# Patient Record
Sex: MALE | Race: WHITE | NOT HISPANIC OR LATINO | Employment: FULL TIME | URBAN - METROPOLITAN AREA
[De-identification: names, ages, dates, MRNs, and addresses within clinical notes are randomized per-mention and may not be internally consistent; named-entity substitution may affect disease eponyms.]

---

## 2019-11-12 ENCOUNTER — HOSPITAL ENCOUNTER (OUTPATIENT)
Dept: OTHER | Facility: HOSPITAL | Age: 24
Discharge: HOME | End: 2019-11-12
Attending: NURSE PRACTITIONER

## 2019-11-12 LAB
APPEARANCE UR: CLEAR
BILIRUB UR QL: NEGATIVE
COLOR UR: YELLOW
CONV COLLECTION SOURCE (UA): NORMAL
CONV UROBILINOGEN IN URINE BY AUTOMATED TEST STRIP: 1 {EHRLICHU}/DL (ref 0.1–1)
GLUCOSE UR QL: NEGATIVE MG/DL
HGB UR QL STRIP: NEGATIVE
KETONES UR QL STRIP: NEGATIVE MG/DL
LEUKOCYTE ESTERASE UR QL STRIP: NEGATIVE
NITRITE UR QL STRIP: NEGATIVE
PH UR STRIP.AUTO: 7.5 [PH] (ref 5–8)
PROT UR QL: NEGATIVE MG/DL
SP GR UR: 1.02 (ref 1–1.03)

## 2019-11-14 LAB — BACTERIA UR CULT: NORMAL

## 2020-02-22 ENCOUNTER — HOSPITAL ENCOUNTER (OUTPATIENT)
Dept: URGENT CARE | Facility: CLINIC | Age: 25
Discharge: HOME | End: 2020-02-22
Attending: NURSE PRACTITIONER

## 2020-02-24 LAB — BACTERIA SPEC AEROBE CULT: NORMAL

## 2020-05-18 ENCOUNTER — TELEMEDICINE CONVERTED (OUTPATIENT)
Dept: GASTROENTEROLOGY | Facility: CLINIC | Age: 25
End: 2020-05-18
Attending: NURSE PRACTITIONER

## 2020-05-26 ENCOUNTER — HOSPITAL ENCOUNTER (OUTPATIENT)
Dept: OTHER | Facility: HOSPITAL | Age: 25
Discharge: HOME | End: 2020-05-26
Attending: NURSE PRACTITIONER

## 2020-05-26 LAB
APPEARANCE UR: CLEAR
BILIRUB UR QL: NEGATIVE
COLOR UR: YELLOW
CONV COLLECTION SOURCE (UA): NORMAL
CONV UROBILINOGEN IN URINE BY AUTOMATED TEST STRIP: 1 {EHRLICHU}/DL (ref 0.1–1)
GLUCOSE UR QL: NEGATIVE MG/DL
HGB UR QL STRIP: NEGATIVE
KETONES UR QL STRIP: NEGATIVE MG/DL
LEUKOCYTE ESTERASE UR QL STRIP: NEGATIVE
NITRITE UR QL STRIP: NEGATIVE
PH UR STRIP.AUTO: 8 [PH] (ref 5–8)
PROT UR QL: NEGATIVE MG/DL
SP GR UR: 1.01 (ref 1–1.03)

## 2020-05-28 LAB — BACTERIA UR CULT: NORMAL

## 2020-06-02 LAB — SARS-COV-2 RNA SPEC QL NAA+PROBE: NOT DETECTED

## 2020-06-04 ENCOUNTER — HOSPITAL ENCOUNTER (OUTPATIENT)
Dept: GASTROENTEROLOGY | Facility: HOSPITAL | Age: 25
Setting detail: HOSPITAL OUTPATIENT SURGERY
Discharge: HOME | End: 2020-06-04
Attending: INTERNAL MEDICINE

## 2020-06-05 ENCOUNTER — HOSPITAL ENCOUNTER (OUTPATIENT)
Dept: GASTROENTEROLOGY | Facility: HOSPITAL | Age: 25
Setting detail: HOSPITAL OUTPATIENT SURGERY
Discharge: HOME | End: 2020-06-05
Attending: INTERNAL MEDICINE

## 2020-08-11 ENCOUNTER — HOSPITAL ENCOUNTER (OUTPATIENT)
Dept: GENERAL RADIOLOGY | Facility: HOSPITAL | Age: 25
Discharge: HOME | End: 2020-08-11
Attending: NURSE PRACTITIONER

## 2020-08-13 ENCOUNTER — TELEPHONE CONVERTED (OUTPATIENT)
Dept: GASTROENTEROLOGY | Facility: CLINIC | Age: 25
End: 2020-08-13
Attending: NURSE PRACTITIONER

## 2020-08-27 ENCOUNTER — HOSPITAL ENCOUNTER (OUTPATIENT)
Dept: NUCLEAR MEDICINE | Facility: HOSPITAL | Age: 25
Discharge: HOME | End: 2020-08-27
Attending: NURSE PRACTITIONER

## 2020-08-27 LAB
ALBUMIN SERPL-MCNC: 4.8 G/DL (ref 3.5–5)
ALBUMIN/GLOB SERPL: 1.7 {RATIO} (ref 1.4–2.6)
ALP SERPL-CCNC: 63 U/L (ref 53–128)
ALT SERPL-CCNC: 45 U/L (ref 10–40)
ANION GAP SERPL CALC-SCNC: 21 MMOL/L (ref 8–19)
AST SERPL-CCNC: 24 U/L (ref 15–50)
BASOPHILS # BLD AUTO: 0.04 10*3/UL (ref 0–0.2)
BASOPHILS NFR BLD AUTO: 0.5 % (ref 0–3)
BILIRUB SERPL-MCNC: 0.35 MG/DL (ref 0.2–1.3)
BUN SERPL-MCNC: 16 MG/DL (ref 5–25)
BUN/CREAT SERPL: 15 {RATIO} (ref 6–20)
CALCIUM SERPL-MCNC: 10.3 MG/DL (ref 8.7–10.4)
CHLORIDE SERPL-SCNC: 104 MMOL/L (ref 99–111)
CONV ABS IMM GRAN: 0.03 10*3/UL (ref 0–0.2)
CONV AFP: 1.9 NG/ML (ref 0–8.7)
CONV CO2: 23 MMOL/L (ref 22–32)
CONV IMMATURE GRAN: 0.4 % (ref 0–1.8)
CONV TOTAL PROTEIN: 7.6 G/DL (ref 6.3–8.2)
CREAT UR-MCNC: 1.08 MG/DL (ref 0.7–1.2)
DEPRECATED RDW RBC AUTO: 42.3 FL (ref 35.1–43.9)
EOSINOPHIL # BLD AUTO: 0.13 10*3/UL (ref 0–0.7)
EOSINOPHIL # BLD AUTO: 1.5 % (ref 0–7)
ERYTHROCYTE [DISTWIDTH] IN BLOOD BY AUTOMATED COUNT: 12.8 % (ref 11.6–14.4)
FERRITIN SERPL-MCNC: 186 NG/ML (ref 30–300)
GFR SERPLBLD BASED ON 1.73 SQ M-ARVRAT: >60 ML/MIN/{1.73_M2}
GLOBULIN UR ELPH-MCNC: 2.8 G/DL (ref 2–3.5)
GLUCOSE SERPL-MCNC: 90 MG/DL (ref 70–99)
HCT VFR BLD AUTO: 50 % (ref 42–52)
HGB BLD-MCNC: 16.1 G/DL (ref 14–18)
INR PPP: 0.94 (ref 2–3)
IRON SATN MFR SERPL: 30 % (ref 20–55)
IRON SERPL-MCNC: 123 UG/DL (ref 70–180)
LYMPHOCYTES # BLD AUTO: 3.23 10*3/UL (ref 1–5)
LYMPHOCYTES NFR BLD AUTO: 38.4 % (ref 20–45)
MCH RBC QN AUTO: 29.1 PG (ref 27–31)
MCHC RBC AUTO-ENTMCNC: 32.2 G/DL (ref 33–37)
MCV RBC AUTO: 90.4 FL (ref 80–96)
MONOCYTES # BLD AUTO: 0.43 10*3/UL (ref 0.2–1.2)
MONOCYTES NFR BLD AUTO: 5.1 % (ref 3–10)
NEUTROPHILS # BLD AUTO: 4.55 10*3/UL (ref 2–8)
NEUTROPHILS NFR BLD AUTO: 54.1 % (ref 30–85)
NRBC CBCN: 0 % (ref 0–0.7)
OSMOLALITY SERPL CALC.SUM OF ELEC: 299 MOSM/KG (ref 273–304)
PLATELET # BLD AUTO: 237 10*3/UL (ref 130–400)
PMV BLD AUTO: 10 FL (ref 9.4–12.4)
POTASSIUM SERPL-SCNC: 3.7 MMOL/L (ref 3.5–5.3)
PROTHROMBIN TIME: 10.3 S (ref 9.4–12)
RBC # BLD AUTO: 5.53 10*6/UL (ref 4.7–6.1)
SODIUM SERPL-SCNC: 144 MMOL/L (ref 135–147)
TIBC SERPL-MCNC: 413 UG/DL (ref 245–450)
TRANSFERRIN SERPL-MCNC: 289 MG/DL (ref 215–365)
WBC # BLD AUTO: 8.41 10*3/UL (ref 4.8–10.8)

## 2020-08-28 LAB
ALBUMIN SERPL-MCNC: 4 G/DL (ref 2.9–4.4)
ALBUMIN/GLOB SERPL: 1.3 {RATIO} (ref 0.7–1.7)
ALPHA2 GLOB SERPL ELPH-MCNC: 0.8 G/DL (ref 0.4–1)
BETA GLOBULIN: 1.1 G/DL (ref 0.7–1.3)
CERULOPLASMIN SERPL-MCNC: 20.2 MG/DL (ref 16–31)
CONV ALPHA-1-GLOBULIN: 0.2 G/DL (ref 0–0.4)
CONV HEPATITIS B SURFACE AG W CONFIRMATION RE: NEGATIVE
CONV IMMUNOGLOBULIN G (IGG): 915 MG/DL (ref 603–1613)
CONV IMMUNOGLOBULIN M (IGM): 22 MG/DL (ref 20–172)
CONV PE INTERPRETATION: NORMAL
CONV PE NOTE: NORMAL
CONV TOTAL PROTEIN: 7 G/DL (ref 6–8.5)
DEPRECATED MITOCHONDRIA M2 IGG SER-ACNC: <20 UNITS (ref 0–20)
DSDNA AB SER-ACNC: NEGATIVE [IU]/ML
ENA AB SER IA-ACNC: NEGATIVE {RATIO}
GAMMA GLOB SERPL ELPH-MCNC: 0.9 G/DL (ref 0.4–1.8)
GLOBULIN UR ELPH-MCNC: 3 G/DL (ref 2.2–3.9)
HAV IGM SERPL QL IA: NEGATIVE
HBV CORE IGM SERPL QL IA: NEGATIVE
HCV AB SER DONR QL: <0.1 S/CO RATIO (ref 0–0.9)
IGA SERPL-MCNC: 178 MG/DL (ref 90–386)
M-SPIKE: NORMAL G/DL
PROT PATTERN SERPL IFE-IMP: NORMAL
SMOOTH MUSCLE F-ACTIN AB IGG: 19 UNITS (ref 0–19)

## 2020-08-29 LAB — COPPER SERPL-MCNC: 96 UG/DL (ref 72–166)

## 2020-08-31 LAB
A1AT SERPL-MCNC: 125 MG/DL (ref 95–164)
PHENOTYPE: NORMAL

## 2020-09-01 LAB — CONV NASH FIBROSURE: NORMAL

## 2020-10-05 ENCOUNTER — TELEPHONE CONVERTED (OUTPATIENT)
Dept: GASTROENTEROLOGY | Facility: CLINIC | Age: 25
End: 2020-10-05
Attending: NURSE PRACTITIONER

## 2021-05-10 NOTE — H&P
History and Physical      Patient Name: Abhay Keene   Patient ID: [id number]   Sex: Male   YOB: 1995        Visit Date: May 18, 2020    Provider: HAILE Ivey   Location: [facility] [place]   Location Address: [address] [place] [address]  [id number]   Location Phone: [phone]          Chief Complaint  · lower Right side pain  · vomiting   · diarrhea      History Of Present Illness  Abhay Keene is a 24 year old /White male who presents to the office today.   Video Conferencing Visit  Abhay Keene is a 24 year old /White male who is presenting for evaluation via video conferencing. Verbal consent obtained before beginning visit.   The following staff were present during this visit: Marilu Montana MA; Monet Lindsey MA          Pt states he's had RUQ pain for years, states he's had testing that's negative. States pain can be sharp, has to lean to left to get relief, can be triggered by meal or can happen w/o eating as well, feels sx are worsening.  If eats very much has abd bloating, early satiety,  and if eats more than once a day has vomiting for the last 2-3 yrs. Has had some diarrhea, hx of chronic constipation. No blood in stool or black stool. Denies NSAID use. No unint wt loss. No HB or indigestion.    2018 negative DISIDA scan.  Labs from Nov unremarkable       Past Surgical History  Arm Surgery         Medication List  Name Date Started Instructions   albuterol sulfate 90 mcg/actuation inhalation HFA aerosol inhaler  inhale 1 puff (90 mcg) by inhalation route every 6 hours as needed   metoprolol succinate 25 mg oral tablet extended release 24 hr  take 1 tablet (25 mg) by oral route once daily   quetiapine 50 mg oral tablet  take 1 tablet (50 mg) by oral route 2 times per day         Allergy List  SULFA (SULFONAMIDES)       Allergies Reconciled  Family Medical History  Colon Cancer         Social History  Tobacco         Review of  Systems  · Constitutional  o Admits  o : good general health lately, no acute distress  · HENT  o Denies  o : hearing problems, trouble swallowing  · Cardiovascular  o Denies  o : chest pain, blood clots or phlebitis  · Respiratory  o Admits  o : asthma  o Denies  o : shortness of breath  · Gastrointestinal  o Denies  o : additional gastrointestinal symptoms except as noted in the HPI  · Genitourinary  o Admits  o : kidney stone  o Denies  o : dysuria  · Neurologic  o Denies  o : strokes, seizure activity  · Musculoskeletal  o Admits  o : arthritis, bone or joint pain  · Psychiatric  o Admits  o : anxiety, pleasant affect  · Heme-Lymph  o Denies  o : bleeding disorder  · Allergic-Immunologic  o Denies  o : seasonal allergies      Physical Examination  · Constitutional  o Appearance  o : well developed, well-nourished, in no acute distress  · Head and Face  o Head  o :   § Inspection  § : atraumatic, normocephalic  · Eyes  o Sclerae  o : sclerae white, no sclerae icterus  · Neck  o Inspection/Palpation  o : supple  · Respiratory  o Respiratory Effort  o : breathing unlabored  · Skin and Subcutaneous Tissue  o General Inspection  o : no lesions present, no rashes present  · Neurologic  o Mental Status Examination  o :   § Orientation  § : grossly oriented to person, place and time  § Speech/Language  § : communication ability within normal limits, voice quality normal, articulation of speech normal, no evidence of aphasia  § Attention  § : attention normal, concentration abilities normal  · Psychiatric  o General  o : Alert and oriented x3  o Mood and Affect  o : Mood and affect are appropriate to circumstances          Assessment  · Pre-op exam     V72.84/Z01.818  · Abdominal bloating     787.3/R14.0  · Right upper quadrant abdominal pain     789.01/R10.11  · Vomiting alone     787.03/R11.11  · Early satiety     780.94/R68.81      Plan  · Orders  o RUQ US (right upper quadrant ultrasound) (91218) - -  05/18/2020  o [facility] Pre-Op Covid-19 Screening (84003) - - 05/18/2020  · Medications  o Medications have been Reconciled  o Transition of Care or Provider Policy  · Instructions  o Please Sign Permit for: EGD  o Indication: RUQ pain , vomiting , abd bloating/early satiety  o Surgical Risk and Benefits: Possible risks/complications, benefits, and alternatives to surgical or invasive procedure have been explained to patient and/or legal guardian; Patient has been evaluated and can tolerate anesthesia and/or sedation. Risks, benefits, and alternatives to anesthesia and sedation have been explained to patient and/or legal guardian.  o Follow Up after Procedure.  o Encouraged to follow-up with Primary Care Provider for preventative care.  o Patient was educated/instructed on their diagnosis, treatment and medications prior to discharge from the clinic today.  o Patient instructed to seek medical attention urgently for new or worsening symptoms.            Electronically Signed by: HAILE Ivey -Author on August 13, 2020 04:18:44 PM

## 2021-05-13 NOTE — PROGRESS NOTES
Progress Note      Patient Name: Abhay Keene   Patient ID: [id number]   Sex: Male   YOB: 1995    Primary Care Provider: Emma BE   Referring Provider: Eun BE    Visit Date: August 13, 2020    Provider: HAILE Ivey   Location: [facility] [place]   Location Address: [address] [place] [address]  [id number]   Location Phone: [phone]          Chief Complaint  · follow up EGD   · liver enzymes are high       History Of Present Illness  TELEHEALTH TELEPHONE VISIT  Chief Complaint: PT state it's a follow up on EGD. He did an ultrasound the other day. liver enzymes are high, he went to his PCP and he got blood. No other issue at this time.   Abahy Keene is a 24 year old /White male who is presenting for evaluation via telehealth telephone visit. Verbal consent obtained before beginning visit.   Provider spent 14 minutes with the patient during the telehealth visit.   The following staff were present during this visit: Pablo Raymond MA; Uma Suárez MOHELENE   Past Medical History/ Overview of Patient Symptoms     Patient initially seen May 2020 with complaint of vomiting or diarrhea if he eats more than 1 meal per day.  Random right-sided pain.    EGD 6/4/2020 normal esophagus, normal stomachbiopsy negative, and duodenumbiopsy negative (CT per DR Albert showed possible Crohn's --requesting CT)  Colonoscopy 6/5/2020: Adequate prep, normal mucosa throughoutTI biopsy negative, biopsies throughout the colon and rectum were all negative  RUQ US 8/11/2020: No acute findings were seen the liver is more echogenic than the right renal cortex which may suggest borderline hepatic steatosis    Pt states he had recent labs and had some elev LFTs. Pt states he has had some right side discomfort/numbness. Denies ETOH. Pt states he still only eats one meal / day. States he feels bad if he eats more than that--can have more stools and feels  nauseated which makes him feel miserable. He has been doing this x 6 yrs.  Pt states he is feeling better w less vomiting/diarrhea.   States sharp pains are less often/not as bad.       Past Medical History  Asthma; Irregular heart beat         Past Surgical History  Arm Surgery         Medication List  Name Date Started Instructions   albuterol sulfate 90 mcg/actuation inhalation HFA aerosol inhaler  inhale 1 puff (90 mcg) by inhalation route every 6 hours as needed   metoprolol succinate 25 mg oral tablet extended release 24 hr  take 1 tablet (25 mg) by oral route once daily   quetiapine 50 mg oral tablet  take 1 tablet (50 mg) by oral route 2 times per day         Allergy List  SULFA (SULFONAMIDES)         Family Medical History  Colon Cancer         Social History  Tobacco             Assessment  · Elevated LFTs     790.6/R79.89  · Fatty liver     571.8/K76.0  · Nausea     787.02/R11.0  · Abdominal pain     789.00/R10.9  epigastric / RUQ pain      Plan  · Orders  o CBC with Auto Diff [facility] (79598) - - 08/13/2020  o CMP [facility] (55212) - - 08/13/2020  o Acute hepatitis panel (HAV IgM, HbcAb IgM, HbsAg, HCV) (08091, 00540, 93718, 57021, 67880) - - 08/13/2020  o Copper level (92961) - - 08/13/2020  o Ceruloplasmin level (14281) - - 08/13/2020  o Anti-Smooth Muscle Antibody (ASMA) [facility] (85320) - - 08/13/2020  o Anti-mitochondrial antibody assay (96656) - - 08/13/2020  o Alpha-1-Antitrypsin/Phenotype [facility] (78267, 62693) - - 08/13/2020  o Iron Profile (Iron 83776 TIBC 78928 and Transferrin 38487) (IRONP) - - 08/13/2020  o PT/INR (48364) - - 08/13/2020  o AFP ser (27255) - - 08/13/2020  o ISIDRO (antinuclear antibody profile) by enzyme immunoassay (94247) - - 08/13/2020  o Ferritin ser/plas (68632) - - 08/13/2020  o GUERRERO Fibrosure [facility] (drawn at [facility] only and must be fasting 8 hours; requires HT and WT) (74750, 98668, 94744, 53802, 84989, 31147, 30723, 47436, 01533, 08356) - - 08/13/2020  o Immunoelectrophoresis, Serum [facility] (66825,  11563, 83825, 98057) - - 08/13/2020  o Gastric Emptying Study [facility] (97890) - - 08/13/2020  · Instructions  o Patient instructed to seek medical attention urgently for new or worsening symptoms.  o Call the office with any concerns or questions.  o F/U after testing  o Low carb diet r/w pt             Electronically Signed by: HAILE Ivey -Author on August 13, 2020 04:36:02 PM

## 2021-05-13 NOTE — PROGRESS NOTES
Progress Note      Patient Name: Abhay Keene   Patient ID: [id number]   Sex: Male   YOB: 1995    Primary Care Provider: Emma BE   Referring Provider: Eun BE    Visit Date: October 5, 2020    Provider: HAILE Ivey   Location: [facility] [address]   Location Address: [address] [place] [address]  [id number]   Location Phone: [phone]          Chief Complaint  · follow up GES       History Of Present Illness  TELEHEALTH TELEPHONE VISIT  Abhay Keene is a 24 year old /White male who is presenting for evaluation via telehealth telephone visit. Verbal consent obtained before beginning visit.   Provider spent 22 minutes with the patient during the telehealth visit.   The following staff were present during this visit: Pablo Raymond MA; Uma GUILLORY   Past Medical History/ Overview of Patient Symptoms     last CT found [place] was 2016)  Colonoscopy 6/5/2020: Adequate prep, normal mucosa throughout TI biopsy negative, biopsies throughout the colon and rectum were all negative  RUQ US 8/11/2020: No acute findings were seen the liver is more echogenic than the right renal cortex which may suggest borderline hepatic steatosis    Pt last seen August reporting recent elev LFTs and right side discomfort, no ETOH. Reported sharp pains and vomiting were less often, but still trouble eating more than once/day d/t pain and loose stools.     8/27/2020: CBC unremarkable, INR 0.94, CMP unremarkable except ALT 45 (AST 24, alk phos 63, bilirubin 0.35, albumin 4.8)  8/27/2020: Complete Hepatic work-up negative and fibro-sure shows F0, S2, N1  8/27/2020: GES negative    Today, pt still states if he eats more than 1 meal / day he becomes nauseated, feels full frequently. Eating worsens sx. Denies HB but c/o bloating all the time. Tried Prilosec and it did help abd pain, but states it didn't help him be able to eat any more. Soft  "stools, sometimes watery stools, can be postprandial. He does feel better w more frequent stools, states abd pain is worse if no BM.  Averages 4-7 stools/day. No nocturnal stools.    Reported  ht/wt, calculated BMI at 32--pt reports gaining weight over the last year when he quit smoking. Denies marijuana use for several yrs.\">Patient initially seen May 2020 with complaint of vomiting or diarrhea if he eats more than 1 meal per day.  Random right-sided pain.    EGD 6/4/2020 normal esophagus, normal stomach--biopsy negative, and normal duodenum--biopsy negative (CT per DR Albert showed possible Crohn's --requesting CT--> last CT found [facility] was 2016)  Colonoscopy 6/5/2020: Adequate prep, normal mucosa throughout TI biopsy negative, biopsies throughout the colon and rectum were all negative  RUQ US 8/11/2020: No acute findings were seen the liver is more echogenic than the right renal cortex which may suggest borderline hepatic steatosis    Pt last seen August reporting recent elev LFTs and right side discomfort, no ETOH. Reported sharp pains and vomiting were less often, but still trouble eating more than once/day d/t pain and loose stools.     8/27/2020: CBC unremarkable, INR 0.94, CMP unremarkable except ALT 45 (AST 24, alk phos 63, bilirubin 0.35, albumin 4.8)  8/27/2020: Complete Hepatic work-up negative and fibro-sure shows F0, S2, N1  8/27/2020: GES negative    Today, pt still states if he eats more than 1 meal / day he becomes nauseated, feels full frequently. Eating worsens sx. Denies HB but c/o bloating all the time. Tried Prilosec and it did help abd pain, but states it didn't help him be able to eat any more. Soft stools, sometimes watery stools, can be postprandial. He does feel better w more frequent stools, states abd pain is worse if no BM.  Averages 4-7 stools/day. No nocturnal stools.    Reported  ht/wt, calculated BMI at 32--pt reports gaining weight over the last year when he quit smoking. Denies " marijuana use for several yrs.       Past Medical History  Asthma; Irregular heart beat         Past Surgical History  Arm Surgery         Medication List  Name Date Started Instructions   albuterol sulfate 90 mcg/actuation inhalation HFA aerosol inhaler  inhale 1 puff (90 mcg) by inhalation route every 6 hours as needed   metoprolol succinate 25 mg oral tablet extended release 24 hr  take 1 tablet (25 mg) by oral route once daily   quetiapine 50 mg oral tablet  take 1 tablet (50 mg) by oral route 2 times per day         Allergy List  SULFA (SULFONAMIDES)         Family Medical History  Disease Name Relative/Age Notes   Colon Cancer Grandmother (maternal)/60   --          Social History  Tobacco             Assessment  · Abdominal pain     789.00/R10.9  · Early satiety     780.94/R68.81  · Loose stools     787.7/R19.5  · Fatty liver     571.8/K76.0  · Heartburn     787.1/R12      Plan  · Orders  o CT Abdomen/Pelvis- Enterography protocol with IV Contrast [facility] (Oral Prep given in CT Dept) (42241) - 789.00/R10.9, 787.7/R19.5 - 10/05/2020  · Medications  o omeprazole 20 mg oral capsule,delayed release(DR/EC)   SIG: take 1 capsule by oral route daily for 30 days   DISP: (30) Capsule with 3 refills  Prescribed on 10/05/2020     · Instructions  o Plan Of Care:   o Call the office with any concerns or questions.  o D/W pt importance of wt loss w fatty liver  o F/U after  o If CT neg, may try xifaxin. Also suggested pt try IB lesly.             Electronically Signed by: HAILE Ivey -Author on October 5, 2020 05:16:08 PM

## 2021-11-03 PROCEDURE — U0004 COV-19 TEST NON-CDC HGH THRU: HCPCS | Performed by: FAMILY MEDICINE

## 2021-11-04 ENCOUNTER — TELEPHONE (OUTPATIENT)
Dept: URGENT CARE | Facility: CLINIC | Age: 26
End: 2021-11-04

## 2021-12-16 ENCOUNTER — LAB REQUISITION (OUTPATIENT)
Dept: LAB | Facility: HOSPITAL | Age: 26
End: 2021-12-16

## 2021-12-16 DIAGNOSIS — E55.9 VITAMIN D DEFICIENCY, UNSPECIFIED: ICD-10-CM

## 2021-12-16 DIAGNOSIS — R00.2 PALPITATIONS: ICD-10-CM

## 2021-12-16 DIAGNOSIS — I10 ESSENTIAL (PRIMARY) HYPERTENSION: ICD-10-CM

## 2021-12-16 DIAGNOSIS — R19.7 DIARRHEA, UNSPECIFIED: ICD-10-CM

## 2021-12-16 DIAGNOSIS — J45.909 UNSPECIFIED ASTHMA, UNCOMPLICATED: ICD-10-CM

## 2021-12-16 DIAGNOSIS — B00.89 OTHER HERPESVIRAL INFECTION: ICD-10-CM

## 2021-12-16 DIAGNOSIS — F41.9 ANXIETY DISORDER, UNSPECIFIED: ICD-10-CM

## 2021-12-16 DIAGNOSIS — R10.84 GENERALIZED ABDOMINAL PAIN: ICD-10-CM

## 2021-12-16 DIAGNOSIS — R53.83 OTHER FATIGUE: ICD-10-CM

## 2021-12-16 DIAGNOSIS — D51.9 VITAMIN B12 DEFICIENCY ANEMIA, UNSPECIFIED: ICD-10-CM

## 2021-12-16 DIAGNOSIS — R73.01 IMPAIRED FASTING GLUCOSE: ICD-10-CM

## 2021-12-16 DIAGNOSIS — K58.2 MIXED IRRITABLE BOWEL SYNDROME: ICD-10-CM

## 2021-12-16 DIAGNOSIS — G43.009 MIGRAINE WITHOUT AURA, NOT INTRACTABLE, WITHOUT STATUS MIGRAINOSUS: ICD-10-CM

## 2021-12-16 DIAGNOSIS — F31.31 BIPOLAR DISORDER, CURRENT EPISODE DEPRESSED, MILD (HCC): ICD-10-CM

## 2021-12-16 DIAGNOSIS — K59.09 OTHER CONSTIPATION: ICD-10-CM

## 2021-12-16 DIAGNOSIS — J30.89 OTHER ALLERGIC RHINITIS: ICD-10-CM

## 2021-12-16 DIAGNOSIS — E53.9 VITAMIN B DEFICIENCY, UNSPECIFIED: ICD-10-CM

## 2021-12-16 DIAGNOSIS — D50.9 IRON DEFICIENCY ANEMIA, UNSPECIFIED: ICD-10-CM

## 2021-12-16 LAB
ALBUMIN SERPL-MCNC: 4.7 G/DL (ref 3.5–5.2)
ALBUMIN/GLOB SERPL: 1.9 G/DL
ALP SERPL-CCNC: 69 U/L (ref 39–117)
ALT SERPL W P-5'-P-CCNC: 104 U/L (ref 1–41)
ANION GAP SERPL CALCULATED.3IONS-SCNC: 13.3 MMOL/L (ref 5–15)
AST SERPL-CCNC: 53 U/L (ref 1–40)
BILIRUB SERPL-MCNC: 0.3 MG/DL (ref 0–1.2)
BUN SERPL-MCNC: 14 MG/DL (ref 6–20)
BUN/CREAT SERPL: 15.2 (ref 7–25)
CALCIUM SPEC-SCNC: 9.9 MG/DL (ref 8.6–10.5)
CHLORIDE SERPL-SCNC: 103 MMOL/L (ref 98–107)
CHOLEST SERPL-MCNC: 176 MG/DL (ref 0–200)
CO2 SERPL-SCNC: 22.7 MMOL/L (ref 22–29)
CREAT SERPL-MCNC: 0.92 MG/DL (ref 0.76–1.27)
GFR SERPL CREATININE-BSD FRML MDRD: 100 ML/MIN/1.73
GLOBULIN UR ELPH-MCNC: 2.5 GM/DL
GLUCOSE SERPL-MCNC: 81 MG/DL (ref 65–99)
HDLC SERPL-MCNC: 43 MG/DL (ref 40–60)
LDLC SERPL CALC-MCNC: 98 MG/DL (ref 0–100)
LDLC/HDLC SERPL: 2.13 {RATIO}
POTASSIUM SERPL-SCNC: 3.9 MMOL/L (ref 3.5–5.2)
PROT SERPL-MCNC: 7.2 G/DL (ref 6–8.5)
SODIUM SERPL-SCNC: 139 MMOL/L (ref 136–145)
TRIGL SERPL-MCNC: 207 MG/DL (ref 0–150)
VLDLC SERPL-MCNC: 35 MG/DL (ref 5–40)

## 2021-12-16 PROCEDURE — 80053 COMPREHEN METABOLIC PANEL: CPT | Performed by: NURSE PRACTITIONER

## 2021-12-16 PROCEDURE — 80061 LIPID PANEL: CPT | Performed by: NURSE PRACTITIONER

## 2021-12-16 PROCEDURE — 84479 ASSAY OF THYROID (T3 OR T4): CPT | Performed by: NURSE PRACTITIONER

## 2021-12-16 PROCEDURE — 82746 ASSAY OF FOLIC ACID SERUM: CPT | Performed by: NURSE PRACTITIONER

## 2021-12-16 PROCEDURE — 83036 HEMOGLOBIN GLYCOSYLATED A1C: CPT | Performed by: NURSE PRACTITIONER

## 2021-12-16 PROCEDURE — 84436 ASSAY OF TOTAL THYROXINE: CPT | Performed by: NURSE PRACTITIONER

## 2021-12-16 PROCEDURE — 82607 VITAMIN B-12: CPT | Performed by: NURSE PRACTITIONER

## 2021-12-16 PROCEDURE — 85025 COMPLETE CBC W/AUTO DIFF WBC: CPT | Performed by: NURSE PRACTITIONER

## 2021-12-16 PROCEDURE — 84550 ASSAY OF BLOOD/URIC ACID: CPT | Performed by: NURSE PRACTITIONER

## 2021-12-16 PROCEDURE — 84466 ASSAY OF TRANSFERRIN: CPT | Performed by: NURSE PRACTITIONER

## 2021-12-16 PROCEDURE — 84443 ASSAY THYROID STIM HORMONE: CPT | Performed by: NURSE PRACTITIONER

## 2021-12-16 PROCEDURE — 82306 VITAMIN D 25 HYDROXY: CPT | Performed by: NURSE PRACTITIONER

## 2021-12-16 PROCEDURE — 83540 ASSAY OF IRON: CPT | Performed by: NURSE PRACTITIONER

## 2021-12-17 LAB
25(OH)D3 SERPL-MCNC: 35.4 NG/ML (ref 30–100)
BASOPHILS # BLD AUTO: 0.06 10*3/MM3 (ref 0–0.2)
BASOPHILS NFR BLD AUTO: 0.7 % (ref 0–1.5)
DEPRECATED RDW RBC AUTO: 42.9 FL (ref 37–54)
EOSINOPHIL # BLD AUTO: 0.08 10*3/MM3 (ref 0–0.4)
EOSINOPHIL NFR BLD AUTO: 0.9 % (ref 0.3–6.2)
ERYTHROCYTE [DISTWIDTH] IN BLOOD BY AUTOMATED COUNT: 13.9 % (ref 12.3–15.4)
FOLATE SERPL-MCNC: 19.2 NG/ML (ref 4.78–24.2)
HCT VFR BLD AUTO: 46.2 % (ref 37.5–51)
HGB BLD-MCNC: 15.7 G/DL (ref 13–17.7)
IMM GRANULOCYTES # BLD AUTO: 0.03 10*3/MM3 (ref 0–0.05)
IMM GRANULOCYTES NFR BLD AUTO: 0.4 % (ref 0–0.5)
IRON 24H UR-MRATE: 98 MCG/DL (ref 59–158)
IRON SATN MFR SERPL: 24 % (ref 20–50)
LYMPHOCYTES # BLD AUTO: 3.11 10*3/MM3 (ref 0.7–3.1)
LYMPHOCYTES NFR BLD AUTO: 36.5 % (ref 19.6–45.3)
MCH RBC QN AUTO: 29.3 PG (ref 26.6–33)
MCHC RBC AUTO-ENTMCNC: 34 G/DL (ref 31.5–35.7)
MCV RBC AUTO: 86.4 FL (ref 79–97)
MONOCYTES # BLD AUTO: 0.52 10*3/MM3 (ref 0.1–0.9)
MONOCYTES NFR BLD AUTO: 6.1 % (ref 5–12)
NEUTROPHILS NFR BLD AUTO: 4.71 10*3/MM3 (ref 1.7–7)
NEUTROPHILS NFR BLD AUTO: 55.4 % (ref 42.7–76)
NRBC BLD AUTO-RTO: 0 /100 WBC (ref 0–0.2)
PLATELET # BLD AUTO: 277 10*3/MM3 (ref 140–450)
PMV BLD AUTO: 10.7 FL (ref 6–12)
RBC # BLD AUTO: 5.35 10*6/MM3 (ref 4.14–5.8)
T-UPTAKE NFR SERPL: 1.14 TBI (ref 0.8–1.3)
T4 SERPL-MCNC: 7.6 MCG/DL (ref 4.5–11.7)
TIBC SERPL-MCNC: 410 MCG/DL (ref 298–536)
TRANSFERRIN SERPL-MCNC: 275 MG/DL (ref 200–360)
TSH SERPL DL<=0.05 MIU/L-ACNC: 2.85 UIU/ML (ref 0.27–4.2)
URATE SERPL-MCNC: 7.5 MG/DL (ref 3.4–7)
VIT B12 BLD-MCNC: 632 PG/ML (ref 211–946)
WBC NRBC COR # BLD: 8.51 10*3/MM3 (ref 3.4–10.8)

## 2021-12-20 LAB — HBA1C MFR BLD: 5.7 % (ref 4.8–5.6)

## 2022-07-11 PROCEDURE — U0004 COV-19 TEST NON-CDC HGH THRU: HCPCS | Performed by: NURSE PRACTITIONER

## 2022-07-12 ENCOUNTER — TELEPHONE (OUTPATIENT)
Dept: URGENT CARE | Facility: CLINIC | Age: 27
End: 2022-07-12

## 2023-11-29 ENCOUNTER — TRANSCRIBE ORDERS (OUTPATIENT)
Dept: ADMINISTRATIVE | Facility: HOSPITAL | Age: 28
End: 2023-11-29
Payer: COMMERCIAL

## 2023-11-29 ENCOUNTER — LAB (OUTPATIENT)
Dept: LAB | Facility: HOSPITAL | Age: 28
End: 2023-11-29
Payer: COMMERCIAL

## 2023-11-29 DIAGNOSIS — R10.11 RIGHT UPPER QUADRANT PAIN: Primary | ICD-10-CM

## 2023-11-29 DIAGNOSIS — R10.11 RIGHT UPPER QUADRANT PAIN: ICD-10-CM

## 2023-11-29 PROCEDURE — 80074 ACUTE HEPATITIS PANEL: CPT

## 2023-11-29 PROCEDURE — 86708 HEPATITIS A ANTIBODY: CPT

## 2023-11-29 PROCEDURE — 86706 HEP B SURFACE ANTIBODY: CPT

## 2023-11-29 PROCEDURE — 36415 COLL VENOUS BLD VENIPUNCTURE: CPT

## 2023-11-30 LAB
HAV IGM SERPL QL IA: NORMAL
HBV CORE IGM SERPL QL IA: NORMAL
HBV SURFACE AB SER RIA-ACNC: NORMAL
HBV SURFACE AG SERPL QL IA: NORMAL
HCV AB SER DONR QL: NORMAL

## 2023-12-01 LAB — HAV AB SER QL IA: NEGATIVE

## 2023-12-29 ENCOUNTER — TELEMEDICINE (OUTPATIENT)
Dept: FAMILY MEDICINE CLINIC | Facility: TELEHEALTH | Age: 28
End: 2023-12-29
Payer: COMMERCIAL

## 2023-12-29 VITALS — HEART RATE: 127 BPM | TEMPERATURE: 99.8 F

## 2023-12-29 DIAGNOSIS — R50.9 FEVER, UNSPECIFIED FEVER CAUSE: ICD-10-CM

## 2023-12-29 DIAGNOSIS — R05.9 COUGH, UNSPECIFIED TYPE: Primary | ICD-10-CM

## 2023-12-29 DIAGNOSIS — J10.1 INFLUENZA A: ICD-10-CM

## 2023-12-29 RX ORDER — DEXTROMETHORPHAN HYDROBROMIDE AND PROMETHAZINE HYDROCHLORIDE 15; 6.25 MG/5ML; MG/5ML
5 SYRUP ORAL NIGHTLY PRN
Qty: 118 ML | Refills: 0 | Status: SHIPPED | OUTPATIENT
Start: 2023-12-29

## 2023-12-29 RX ORDER — OSELTAMIVIR PHOSPHATE 75 MG/1
75 CAPSULE ORAL 2 TIMES DAILY
Qty: 10 CAPSULE | Refills: 0 | Status: SHIPPED | OUTPATIENT
Start: 2023-12-29 | End: 2024-01-03

## 2023-12-29 NOTE — PROGRESS NOTES
You have chosen to receive care through a telehealth visit.  Do you consent to use a video/audio connection for your medical care today? Yes     HPI  Abhay Keene is a 28 y.o. male  presents with complaint of sore throat, fever body aches. He reports that the body aches are the worst. Additional symptoms that he is having are noted in the ROS portion of this visit. He did have some chest tightness last night also. It has resolved. He has a history of SVT and is on Bystolic. He has not taken it yet today. He has taken ibuprofen for his symptoms. He has no known exposure to illnesses but does work at an [employer].    Review of Systems   Constitutional:  Positive for fatigue and fever. Negative for chills. Appetite change: decreased.  HENT:  Positive for postnasal drip and sore throat. Negative for congestion, rhinorrhea, sinus pressure, sinus pain and sneezing. Ear pain: pressure.   Respiratory:  Positive for cough and chest tightness. Negative for shortness of breath and wheezing.    Gastrointestinal:  Negative for diarrhea and nausea.   Musculoskeletal:  Positive for myalgias.   Neurological:  Positive for headaches.       Past Medical History:   Diagnosis Date    Asthma     Bipolar 1 disorder     SVT (supraventricular tachycardia)     Tachycardia        No family history on file.    Social History     Socioeconomic History    Marital status: Single   Tobacco Use    Smoking status: Former     Types: Cigarettes    Smokeless tobacco: Never   Vaping Use    Vaping Use: Never used   Substance and Sexual Activity    Alcohol use: Never    Drug use: Never    Sexual activity: Defer       Abhay Keene  reports that he has quit smoking. His smoking use included cigarettes. He has never used smokeless tobacco.. I        Pulse (!) 127   Temp 99.8 °F (37.7 °C)     PHYSICAL EXAM  Physical Exam   Constitutional: He is oriented to person, place, and time. He appears well-developed.   HENT:   Head: Normocephalic and  atraumatic.   Nose: Nose normal.   Mouth/Throat: Mucous membranes are erythematous. no white patches  Eyes: Lids are normal. Right eye exhibits no discharge. Left eye exhibits no discharge. Right conjunctiva is not injected. Left conjunctiva is not injected.   Pulmonary/Chest:  No respiratory distress.  Neurological: He is alert and oriented to person, place, and time. No cranial nerve deficit.   Psychiatric: He has a normal mood and affect. His speech is normal and behavior is normal. Judgment and thought content normal.       Results for orders placed or performed in visit on 11/29/23   Hepatitis Panel, Acute    Specimen: Arm, Left; Blood   Result Value Ref Range    Hepatitis B Surface Ag Non-Reactive Non-Reactive    Hep A IgM Non-Reactive Non-Reactive    Hep B C IgM Non-Reactive Non-Reactive    Hepatitis C Ab Non-Reactive Non-Reactive   Hepatitis A Antibody, Total    Specimen: Arm, Left; Blood   Result Value Ref Range    Hep A Total Ab Negative Negative   Hepatitis B Surface Antibody    Specimen: Arm, Left; Blood   Result Value Ref Range    Hep B S Ab Non-Reactive Non-Reactive       Diagnoses and all orders for this visit:    1. Cough, unspecified type (Primary)  -     WILMA FLU + SARS PCR; Future  -     POC Strep A, PCR (Roche [facility]); Future    2. Fever, unspecified fever cause  -     WILMA FLU + SARS PCR; Future  -     POC Strep A, PCR ([facility]ma); Future    Other orders  -     promethazine-dextromethorphan (PROMETHAZINE-DM) 6.25-15 MG/5ML syrup; Take 5 mL by mouth At Night As Needed for Cough.  Dispense: 118 mL; Refill: 0  -     oseltamivir (Tamiflu) 75 MG capsule; Take 1 capsule by mouth 2 (Two) Times a Day for 5 days.  Dispense: 10 capsule; Refill: 0    COVID, strep, flu test results pending  Take Bystolic as soon as soon as possible  May alternate tylenol and ibuprofen for pain or fever  Hydrate well  Do not drive after taking promethazine DM as it may make you drowsy  Tamiflu as directed    FOLLOW-UP  If  symptoms worsen or persist follow up with PCP, [facility] or Urgent Care    Patient verbalizes understanding of medication dosage, comfort measures, instructions for treatment and follow-up.    Kristin Jiang, APRN  12/29/2023  10:11 EST    The use of a video visit has been reviewed with the patient and verbal informed consent has been obtained. Myself and Abhay Keene participated in this visit. The patient is located in [address] [place] [address].    I am located in [place], [place]. Mychart and Tyto were utilized. I spent 25 minutes in the patient's chart for this visit.    Positive Flu A results called to patient. COVID, Flu B and strep results negative. Tamiflu ordered for patient. Work note provided.

## 2023-12-29 NOTE — LETTER
December 29, 2023       No Recipients    Patient: Abhay Keene   YOB: 1995   Date of Visit: 12/29/2023     To whom it may concern:       Abhay Keene was evaluated by me and may return to work on 01/01/2023.    Sincerely,        HAILE León        CC:   No Recipients

## 2024-02-07 ENCOUNTER — OFFICE VISIT (OUTPATIENT)
Dept: SLEEP MEDICINE | Facility: HOSPITAL | Age: 29
End: 2024-02-07
Payer: COMMERCIAL

## 2024-02-07 VITALS
BODY MASS INDEX: 38.35 KG/M2 | HEIGHT: 71 IN | SYSTOLIC BLOOD PRESSURE: 123 MMHG | DIASTOLIC BLOOD PRESSURE: 69 MMHG | HEART RATE: 79 BPM | OXYGEN SATURATION: 96 % | WEIGHT: 273.9 LBS

## 2024-02-07 DIAGNOSIS — G47.10 HYPERSOMNIA: ICD-10-CM

## 2024-02-07 DIAGNOSIS — G47.26 CIRCADIAN RHYTHM SLEEP DISORDER, SHIFT WORK TYPE: ICD-10-CM

## 2024-02-07 DIAGNOSIS — R06.83 SNORING: ICD-10-CM

## 2024-02-07 DIAGNOSIS — E66.9 OBESITY (BMI 30-39.9): ICD-10-CM

## 2024-02-07 DIAGNOSIS — R29.818 SUSPECTED SLEEP APNEA: Primary | ICD-10-CM

## 2024-02-07 DIAGNOSIS — R61 NIGHT SWEATS: ICD-10-CM

## 2024-02-07 PROCEDURE — G0463 HOSPITAL OUTPT CLINIC VISIT: HCPCS

## 2024-02-07 RX ORDER — OMEPRAZOLE 40 MG/1
CAPSULE, DELAYED RELEASE ORAL
COMMUNITY
Start: 2023-11-13

## 2024-02-07 NOTE — PROGRESS NOTES
Sleep Consultation    Patient Name: Abhay Keene  Age/Sex: 28 y.o. male  : 1995  MRN: [id number]    Date of Encounter Visit: 2024  Encounter Provider: Kellie Palma MD  Referring Provider: MATT Renner*  Place of Service: [facility]  Patient Care Team:  Jaimee Mulligan APRN as PCP - General (Nurse Practitioner)    Subjective:     Reason for Consult: Sleep apnea evaluation    History of Present Illness:  Abhay Keene is a 28 y.o. male is here for evaluation of FERCHO due to suggestive symptoms.    Patient complains of daytime fatigue and sleepiness with an [place] Sleepiness Scale (ESS) of 12.  Patient complains of loud snoring in all position, waking up gasping for breath, waking up with choking or respiratory discomfort, morning headache and dry throat  Patient has restless leg syndrome symptoms  Patient has night sweats  Patient also complains of problem falling asleep, staying asleep and frequent awakenings with restless sleep and minimal nocturia  Patient denies any cataplexy, sleep paralysis or other symptoms to suggest narcolepsy.  Patient denies any parasomnias.  Denies any history of seizure disorder or recent head trauma.  Patient spends adequate amount of time in bed with no evidence of sleep restriction or improper sleep hygiene.   Bedtime is usually around 7-11 AM, wake up time is 1-5 PM with 5-8 hours of sleep in between, patient works night shift  Caffeine intake is usually 1 cup of coffee per day, former smoker, no alcohol or illicit substance abuse  Comorbidities include: ADHD, anxiety and depression, shiftwork sleep disorder, rapid heart rate/tachycardia    Review of Systems:   A twelve-system review was conducted and was negative except for the following: Irregular heartbeat/rapid heart rate, shortness of breath, fainting spells, dizziness anxiety and depression.        Past Medical History:  Past Medical History:   Diagnosis Date     ADHD     Asthma     Bipolar 1 disorder     Depression     SVT (supraventricular tachycardia)     Tachycardia        History reviewed. No pertinent surgical history.    Home Medications:     Current Outpatient Medications:     albuterol sulfate  (90 Base) MCG/ACT inhaler, albuterol sulfate 90 mcg/actuation inhalation HFA aerosol inhaler inhale 1 puff (90 mcg) by inhalation route every 6 hours as needed   Active, Disp: , Rfl:     Bystolic 5 MG tablet, Take 1 tablet by mouth Daily., Disp: , Rfl:     levothyroxine (SYNTHROID, LEVOTHROID) 25 MCG tablet, , Disp: , Rfl:     loperamide (IMODIUM) 2 MG capsule, Take 1 capsule by mouth 4 (Four) Times a Day As Needed for Diarrhea., Disp: 20 capsule, Rfl: 0    omeprazole (priLOSEC) 40 MG capsule, take 1 capsule by mouth every day 30 minutes before breakfast, Disp: , Rfl:     ondansetron ODT (ZOFRAN-ODT) 4 MG disintegrating tablet, Place 1 tablet on the tongue Every 8 (Eight) Hours As Needed for Nausea or Vomiting., Disp: 10 tablet, Rfl: 0    promethazine-dextromethorphan (PROMETHAZINE-DM) 6.25-15 MG/5ML syrup, Take 5 mL by mouth At Night As Needed for Cough., Disp: 118 mL, Rfl: 0    QUEtiapine (SEROquel) 100 MG tablet, Take 1 tablet by mouth 2 (Two) Times a Day., Disp: , Rfl:     Symbicort 160-4.5 MCG/ACT inhaler, Inhale 2 puffs 2 (Two) Times a Day., Disp: , Rfl:     valACYclovir (VALTREX) 500 MG tablet, Take 1 tablet by mouth 2 (Two) Times a Day., Disp: , Rfl:     QUEtiapine (SEROquel) 50 MG tablet, quetiapine 50 mg oral tablet take 1 tablet (50 mg) by oral route 2 times per day   Active (Patient not taking: Reported on 2024), Disp: , Rfl:     Allergies:  Allergies   Allergen Reactions    Sulfa Antibiotics Unknown - High Severity       Past Social History:  Social History     Socioeconomic History    Marital status: Single   Tobacco Use    Smoking status: Former     Types: Cigarettes     Quit date:      Years since quittin.1    Smokeless tobacco: Never  "  Vaping Use    Vaping Use: Never used   Substance and Sexual Activity    Alcohol use: Never    Drug use: Yes     Types: Marijuana    Sexual activity: Defer       Past Family History:  History reviewed. No pertinent family history.  No known family history of obstructive sleep apnea  Objective:        Vital Signs:   Visit Vitals  /69   Pulse 79   Ht 180.3 cm (71\")   Wt 124 kg (273 lb 14.4 oz)   SpO2 96%   BMI 38.20 kg/m²     Wt Readings from Last 3 Encounters:   02/07/24 124 kg (273 lb 14.4 oz)   03/31/23 120 kg (265 lb 6.4 oz)   07/11/22 113 kg (249 lb)     Neck Circumference: 17 inches    Physical Exam:   GEN:  No acute distress, alert, cooperative, well developed, obese   EYES:   Sclerae clear. No icterus. PERRL. Normal EOM  ENT:   External ears/nose normal, no oral lesions, no thrush, mucous membranes moist, Septum midline. Mallampati II airway. With slightly enlarged uvula    NECK:  Supple, midline trachea, no JVD  LUNGS: Normal chest on inspection, CTAB, no wheezes. No rhonchi. No crackles. Respirations regular, even and unlabored.   CV:  Regular rhythm and rate. Normal S1/S2. No murmurs, gallops, or rubs noted.  ABD:  Soft, nontender and nondistended. Normal bowel sounds. No guarding  EXT:  Moves all extremities well. No cyanosis. No redness. No edema.   Skin: Dry, intact, no bleeding      Diagnostic Data:  No prior studies     Assessment and Plan:       ICD-10-CM ICD-9-CM   1. Suspected sleep apnea  R29.818 781.99   2. Snoring  R06.83 786.09   3. Circadian rhythm sleep disorder, shift work type  G47.26 327.36   4. Obesity (BMI 30-39.9)  E66.9 278.00   5. Night sweats  R61 780.8   6. Hypersomnia  G47.10 780.54       Recommendations:     Patient is a good candidate for the home sleep study which will be ordered today  If the home sleep study is inconclusive or nondiagnostic, we will consider the in-lab sleep study  Patient is agreeable with the CPAP therapy and will be initiated accordingly      Patient " was educated in depth about FERCHO and cardiovascular consequences if left untreated, including but not limited to CHF, CAD, arrhythmias, CVA, and/or HTN. Education also provided about the diagnostic tools for FERCHO, including the polysomnography and the treatment modalities, including the CPAP.     If patient has obstructive sleep apnea the recommend treatment is CPAP and will start CPAP and patient will follow up within 31-90 days after starting CPAP for compliance review.   Will address alternative treatment option if intolerant to CPAP     Adherence to the CPAP is a key factor in successful treatment of FERCHO and the patient was encouraged to contact us in case of problem with the CPAP or the mask that can limit the tolerance of the compliance with the therapy.    Patient was educated about the impact of obesity on sleep apnea and the benefit of weight loss and weight loss was recommended    Orders Placed This Encounter   Procedures    Home Sleep Study     No orders of the defined types were placed in this encounter.     Return in about 3 months (around 5/7/2024).    Kellie Palma MD   [facility]   02/07/24  14:26 EST    Dictated utilizing Dragon dictation

## 2024-02-22 ENCOUNTER — APPOINTMENT (OUTPATIENT)
Dept: GENERAL RADIOLOGY | Facility: HOSPITAL | Age: 29
End: 2024-02-22
Payer: COMMERCIAL

## 2024-02-22 ENCOUNTER — HOSPITAL ENCOUNTER (EMERGENCY)
Facility: HOSPITAL | Age: 29
Discharge: HOME | End: 2024-02-22
Attending: EMERGENCY MEDICINE
Payer: COMMERCIAL

## 2024-02-22 VITALS
HEIGHT: 71 IN | DIASTOLIC BLOOD PRESSURE: 79 MMHG | OXYGEN SATURATION: 96 % | TEMPERATURE: 98.8 F | HEART RATE: 82 BPM | SYSTOLIC BLOOD PRESSURE: 123 MMHG | WEIGHT: 272.05 LBS | RESPIRATION RATE: 16 BRPM | BODY MASS INDEX: 38.09 KG/M2

## 2024-02-22 DIAGNOSIS — R00.2 PALPITATIONS: Primary | ICD-10-CM

## 2024-02-22 LAB
ALBUMIN SERPL-MCNC: 4.3 G/DL (ref 3.5–5.2)
ALBUMIN/GLOB SERPL: 1.3 G/DL
ALP SERPL-CCNC: 75 U/L (ref 39–117)
ALT SERPL W P-5'-P-CCNC: 93 U/L (ref 1–41)
ANION GAP SERPL CALCULATED.3IONS-SCNC: 12.7 MMOL/L (ref 5–15)
AST SERPL-CCNC: 40 U/L (ref 1–40)
BASOPHILS # BLD AUTO: 0.05 10*3/MM3 (ref 0–0.2)
BASOPHILS NFR BLD AUTO: 0.5 % (ref 0–1.5)
BILIRUB SERPL-MCNC: 0.2 MG/DL (ref 0–1.2)
BUN SERPL-MCNC: 15 MG/DL (ref 6–20)
BUN/CREAT SERPL: 17.2 (ref 7–25)
CALCIUM SPEC-SCNC: 9.8 MG/DL (ref 8.6–10.5)
CHLORIDE SERPL-SCNC: 102 MMOL/L (ref 98–107)
CO2 SERPL-SCNC: 25.3 MMOL/L (ref 22–29)
CREAT SERPL-MCNC: 0.87 MG/DL (ref 0.76–1.27)
DEPRECATED RDW RBC AUTO: 41.7 FL (ref 37–54)
EGFRCR SERPLBLD CKD-EPI 2021: 121.3 ML/MIN/1.73
EOSINOPHIL # BLD AUTO: 0.09 10*3/MM3 (ref 0–0.4)
EOSINOPHIL NFR BLD AUTO: 0.9 % (ref 0.3–6.2)
ERYTHROCYTE [DISTWIDTH] IN BLOOD BY AUTOMATED COUNT: 13.3 % (ref 12.3–15.4)
GLOBULIN UR ELPH-MCNC: 3.2 GM/DL
GLUCOSE SERPL-MCNC: 86 MG/DL (ref 65–99)
HCT VFR BLD AUTO: 47.4 % (ref 37.5–51)
HGB BLD-MCNC: 15.4 G/DL (ref 13–17.7)
HOLD SPECIMEN: NORMAL
HOLD SPECIMEN: NORMAL
IMM GRANULOCYTES # BLD AUTO: 0.06 10*3/MM3 (ref 0–0.05)
IMM GRANULOCYTES NFR BLD AUTO: 0.6 % (ref 0–0.5)
LIPASE SERPL-CCNC: 33 U/L (ref 13–60)
LYMPHOCYTES # BLD AUTO: 2.95 10*3/MM3 (ref 0.7–3.1)
LYMPHOCYTES NFR BLD AUTO: 28.9 % (ref 19.6–45.3)
MAGNESIUM SERPL-MCNC: 2 MG/DL (ref 1.6–2.6)
MCH RBC QN AUTO: 27.8 PG (ref 26.6–33)
MCHC RBC AUTO-ENTMCNC: 32.5 G/DL (ref 31.5–35.7)
MCV RBC AUTO: 85.7 FL (ref 79–97)
MONOCYTES # BLD AUTO: 0.63 10*3/MM3 (ref 0.1–0.9)
MONOCYTES NFR BLD AUTO: 6.2 % (ref 5–12)
NEUTROPHILS NFR BLD AUTO: 6.43 10*3/MM3 (ref 1.7–7)
NEUTROPHILS NFR BLD AUTO: 62.9 % (ref 42.7–76)
NRBC BLD AUTO-RTO: 0 /100 WBC (ref 0–0.2)
NT-PROBNP SERPL-MCNC: <36 PG/ML (ref 0–450)
PLATELET # BLD AUTO: 308 10*3/MM3 (ref 140–450)
PMV BLD AUTO: 9.8 FL (ref 6–12)
POTASSIUM SERPL-SCNC: 3.8 MMOL/L (ref 3.5–5.2)
PROT SERPL-MCNC: 7.5 G/DL (ref 6–8.5)
RBC # BLD AUTO: 5.53 10*6/MM3 (ref 4.14–5.8)
SODIUM SERPL-SCNC: 140 MMOL/L (ref 136–145)
TROPONIN T SERPL HS-MCNC: 7 NG/L
WBC NRBC COR # BLD AUTO: 10.21 10*3/MM3 (ref 3.4–10.8)
WHOLE BLOOD HOLD COAG: NORMAL
WHOLE BLOOD HOLD SPECIMEN: NORMAL

## 2024-02-22 PROCEDURE — 83690 ASSAY OF LIPASE: CPT | Performed by: EMERGENCY MEDICINE

## 2024-02-22 PROCEDURE — 93010 ELECTROCARDIOGRAM REPORT: CPT | Performed by: INTERNAL MEDICINE

## 2024-02-22 PROCEDURE — 83880 ASSAY OF NATRIURETIC PEPTIDE: CPT | Performed by: EMERGENCY MEDICINE

## 2024-02-22 PROCEDURE — 36415 COLL VENOUS BLD VENIPUNCTURE: CPT

## 2024-02-22 PROCEDURE — 93005 ELECTROCARDIOGRAM TRACING: CPT

## 2024-02-22 PROCEDURE — 93005 ELECTROCARDIOGRAM TRACING: CPT | Performed by: EMERGENCY MEDICINE

## 2024-02-22 PROCEDURE — 85025 COMPLETE CBC W/AUTO DIFF WBC: CPT

## 2024-02-22 PROCEDURE — 80053 COMPREHEN METABOLIC PANEL: CPT | Performed by: EMERGENCY MEDICINE

## 2024-02-22 PROCEDURE — 84484 ASSAY OF TROPONIN QUANT: CPT | Performed by: EMERGENCY MEDICINE

## 2024-02-22 PROCEDURE — 71045 X-RAY EXAM CHEST 1 VIEW: CPT

## 2024-02-22 PROCEDURE — 99284 EMERGENCY DEPT VISIT MOD MDM: CPT

## 2024-02-22 PROCEDURE — 83735 ASSAY OF MAGNESIUM: CPT | Performed by: EMERGENCY MEDICINE

## 2024-02-22 RX ORDER — SODIUM CHLORIDE 0.9 % (FLUSH) 0.9 %
10 SYRINGE (ML) INJECTION AS NEEDED
Status: DISCONTINUED | OUTPATIENT
Start: 2024-02-22 | End: 2024-02-22 | Stop reason: HOSPADM

## 2024-02-22 RX ORDER — ASPIRIN 81 MG/1
324 TABLET, CHEWABLE ORAL ONCE
Status: DISCONTINUED | OUTPATIENT
Start: 2024-02-22 | End: 2024-02-22 | Stop reason: HOSPADM

## 2024-02-22 NOTE — ED PROVIDER NOTES
Time: 4:57 AM EST  Date of encounter:  2/22/2024  Independent Historian/Clinical History and Information was obtained by:   Patient    History is limited by: N/A    Chief Complaint: palpitations      History of Present Illness:  Patient is a 28 y.o. year old male who presents to the emergency department for evaluation of palpitations. Patient said he was at work when his watch gave him a notification that his heart rate has been above 100.  He said he tried to relax but his heart rate kept on going up.  He does have a history of SVT.  He is on a beta-blocker but ran out 3 days ago.  He does report some chest discomfort.  No shortness of breath.  No other symptoms.    HPI    Patient Care Team  Primary Care Provider: Jaimee Mulligan APRN    Past Medical History:     Allergies   Allergen Reactions    Sulfa Antibiotics Unknown - High Severity     Past Medical History:   Diagnosis Date    ADHD     Asthma     Bipolar 1 disorder     Depression     SVT (supraventricular tachycardia)     Tachycardia      History reviewed. No pertinent surgical history.  History reviewed. No pertinent family history.    Home Medications:  Prior to Admission medications    Medication Sig Start Date End Date Taking? Authorizing Provider   albuterol sulfate  (90 Base) MCG/ACT inhaler albuterol sulfate 90 mcg/actuation inhalation HFA aerosol inhaler inhale 1 puff (90 mcg) by inhalation route every 6 hours as needed   Active    Emergency, Nurse URMILA Hernandez   Bystolic 5 MG tablet Take 1 tablet by mouth Daily. 5/26/22   Emergency, Nurse David RN   levothyroxine (SYNTHROID, LEVOTHROID) 25 MCG tablet  7/5/22   Emergency, Nurse David RN   loperamide (IMODIUM) 2 MG capsule Take 1 capsule by mouth 4 (Four) Times a Day As Needed for Diarrhea. 3/31/23   Nahid Gamble PA   omeprazole (priLOSEC) 40 MG capsule take 1 capsule by mouth every day 30 minutes before breakfast 11/13/23   Provider, MD Dmitri   ondansetron ODT (ZOFRAN-ODT) 4 MG  "disintegrating tablet Place 1 tablet on the tongue Every 8 (Eight) Hours As Needed for Nausea or Vomiting. 3/31/23   Nahid Gamble PA   promethazine-dextromethorphan (PROMETHAZINE-DM) 6.25-15 MG/5ML syrup Take 5 mL by mouth At Night As Needed for Cough. 23   Kristin Jiang APRN   QUEtiapine (SEROquel) 100 MG tablet Take 1 tablet by mouth 2 (Two) Times a Day. 22   Emergency, Nurse Epic, RN   QUEtiapine (SEROquel) 50 MG tablet quetiapine 50 mg oral tablet take 1 tablet (50 mg) by oral route 2 times per day   Active  Patient not taking: Reported on 2024    Emergency, Nurse David RN   Symbicort 160-4.5 MCG/ACT inhaler Inhale 2 puffs 2 (Two) Times a Day. 22   Emergency, Nurse David RN   valACYclovir (VALTREX) 500 MG tablet Take 1 tablet by mouth 2 (Two) Times a Day. 22   Emergency, Nurse David RN        Social History:   Social History     Tobacco Use    Smoking status: Former     Types: Cigarettes     Quit date:      Years since quittin.1    Smokeless tobacco: Never   Vaping Use    Vaping Use: Never used   Substance Use Topics    Alcohol use: Never    Drug use: Yes     Types: Marijuana         Review of Systems:  Review of Systems   Constitutional:  Negative for chills and fever.   HENT:  Negative for congestion, ear pain and sore throat.    Eyes:  Negative for pain.   Respiratory:  Negative for cough, chest tightness and shortness of breath.    Cardiovascular:  Positive for chest pain and palpitations.   Gastrointestinal:  Negative for abdominal pain, diarrhea, nausea and vomiting.   Genitourinary:  Negative for flank pain and hematuria.   Musculoskeletal:  Negative for joint swelling.   Skin:  Negative for pallor.   Neurological:  Negative for seizures and headaches.   All other systems reviewed and are negative.       Physical Exam:  /79 (BP Location: Left arm, Patient Position: Lying)   Pulse 82   Temp 98.8 °F (37.1 °C) (Oral)   Resp 16   Ht 180.3 cm (71\")   Wt 123 " kg (272 lb 0.8 oz)   SpO2 96%   BMI 37.94 kg/m²     Physical Exam  Constitutional:       Appearance: Normal appearance.   HENT:      Head: Normocephalic and atraumatic.      Nose: Nose normal.      Mouth/Throat:      Mouth: Mucous membranes are moist.   Eyes:      Extraocular Movements: Extraocular movements intact.      Conjunctiva/sclera: Conjunctivae normal.      Pupils: Pupils are equal, round, and reactive to light.   Cardiovascular:      Rate and Rhythm: Normal rate and regular rhythm.      Pulses: Normal pulses.      Heart sounds: Normal heart sounds.   Pulmonary:      Effort: Pulmonary effort is normal.      Breath sounds: Normal breath sounds.   Abdominal:      General: There is no distension.      Palpations: Abdomen is soft.      Tenderness: There is no abdominal tenderness.   Musculoskeletal:         General: Normal range of motion.      Cervical back: Normal range of motion.   Skin:     General: Skin is warm and dry.      Capillary Refill: Capillary refill takes less than 2 seconds.   Neurological:      General: No focal deficit present.      Mental Status: He is alert and oriented to person, place, and time. Mental status is at baseline.   Psychiatric:         Mood and Affect: Mood normal.         Behavior: Behavior normal.                  Procedures:  Procedures      Medical Decision Making:      Comorbidities that affect care:    Tachycardia, Bipolar, depression, anxiety, SVT,, Asthma    External Notes reviewed:    Previous Clinic Note: Patient was seen by sleep medicine on 2/7/2024 for suspected sleep apnea      The following orders were placed and all results were independently analyzed by me:  Orders Placed This Encounter   Procedures    XR Chest 1 View    [place] Draw    High Sensitivity Troponin T    Comprehensive Metabolic Panel    Lipase    BNP    Magnesium    CBC Auto Differential    High Sensitivity Troponin T 2Hr    NPO Diet NPO Type: Strict NPO    Undress & Gown    Continuous Pulse  Oximetry    Oxygen Therapy- Nasal Cannula; Titrate 1-6 LPM Per SpO2; 90 - 95%    ECG 12 Lead ED Triage Standing Order; Chest Pain    ECG 12 Lead ED Triage Standing Order; Chest Pain    Insert Peripheral IV    CBC & Differential    Green Top (Gel)    Lavender Top    Gold Top - SST    Light Blue Top       Medications Given in the Emergency Department:  Medications   sodium chloride 0.9 % flush 10 mL (has no administration in time range)   aspirin chewable tablet 324 mg (324 mg Oral Not Given 2/22/24 0310)        ED Course:    ED Course as of 02/22/24 0502   Thu Feb 22, 2024 0431 ECG 12 Lead ED Triage Standing Order; Chest Pain  Normal sinus rhythm with rate of 93. QRS normal. DE interval normal. QTc interval is normal. No ST elevation or depression. No T wave abnormalities. This EKG was interpreted by me.  [LD]      ED Course User Index  [LD] Rachel Funk MD       Labs:    Lab Results (last 24 hours)       Procedure Component Value Units Date/Time    High Sensitivity Troponin T [[id number]]  (Normal) Collected: 02/22/24 0237    Specimen: Blood Updated: 02/22/24 0316     HS Troponin T 7 ng/L     Narrative:      High Sensitive Troponin T Reference Range:  <14.0 ng/L- Negative Female for AMI  <22.0 ng/L- Negative Male for AMI  >=14 - Abnormal Female indicating possible myocardial injury.  >=22 - Abnormal Male indicating possible myocardial injury.   Clinicians would have to utilize clinical acumen, EKG, Troponin, and serial changes to determine if it is an Acute Myocardial Infarction or myocardial injury due to an underlying chronic condition.         CBC & Differential [[id number]]  (Abnormal) Collected: 02/22/24 0237    Specimen: Blood Updated: 02/22/24 0252    Narrative:      The following orders were created for panel order CBC & Differential.  Procedure                               Abnormality         Status                     ---------                               -----------         ------                      CBC Auto Differential[[id number]]        Abnormal            Final result                 Please view results for these tests on the individual orders.    Comprehensive Metabolic Panel [[id number]]  (Abnormal) Collected: 02/22/24 0237    Specimen: Blood Updated: 02/22/24 0321     Glucose 86 mg/dL      BUN 15 mg/dL      Creatinine 0.87 mg/dL      Sodium 140 mmol/L      Potassium 3.8 mmol/L      Chloride 102 mmol/L      CO2 25.3 mmol/L      Calcium 9.8 mg/dL      Total Protein 7.5 g/dL      Albumin 4.3 g/dL      ALT (SGPT) 93 U/L      AST (SGOT) 40 U/L      Alkaline Phosphatase 75 U/L      Total Bilirubin 0.2 mg/dL      Globulin 3.2 gm/dL      A/G Ratio 1.3 g/dL      BUN/Creatinine Ratio 17.2     Anion Gap 12.7 mmol/L      eGFR 121.3 mL/min/1.73     Narrative:      GFR Normal >60  Chronic Kidney Disease <60  Kidney Failure <15      Lipase [[id number]]  (Normal) Collected: 02/22/24 0237    Specimen: Blood Updated: 02/22/24 0321     Lipase 33 U/L     BNP [[id number]]  (Normal) Collected: 02/22/24 0237    Specimen: Blood Updated: 02/22/24 0315     proBNP <36.0 pg/mL     Narrative:      This assay is used as an aid in the diagnosis of individuals suspected of having heart failure. It can be used as an aid in the diagnosis of acute decompensated heart failure (ADHF) in patients presenting with signs and symptoms of ADHF to the emergency department (ED). In addition, NT-proBNP of <300 pg/mL indicates ADHF is not likely.    Age Range Result Interpretation  NT-proBNP Concentration (pg/mL:      <50             Positive            >450                   Gray                 300-450                    Negative             <300    50-75           Positive            >900                  Gray                300-900                  Negative            <300      >75             Positive            >1800                  Gray                300-1800                  Negative            <300    Magnesium [[id number]]  (Normal)  Collected: 02/22/24 0237    Specimen: Blood Updated: 02/22/24 0321     Magnesium 2.0 mg/dL     CBC Auto Differential [[id number]]  (Abnormal) Collected: 02/22/24 0237    Specimen: Blood Updated: 02/22/24 0252     WBC 10.21 10*3/mm3      RBC 5.53 10*6/mm3      Hemoglobin 15.4 g/dL      Hematocrit 47.4 %      MCV 85.7 fL      MCH 27.8 pg      MCHC 32.5 g/dL      RDW 13.3 %      RDW-SD 41.7 fl      MPV 9.8 fL      Platelets 308 10*3/mm3      Neutrophil % 62.9 %      Lymphocyte % 28.9 %      Monocyte % 6.2 %      Eosinophil % 0.9 %      Basophil % 0.5 %      Immature Grans % 0.6 %      Neutrophils, Absolute 6.43 10*3/mm3      Lymphocytes, Absolute 2.95 10*3/mm3      Monocytes, Absolute 0.63 10*3/mm3      Eosinophils, Absolute 0.09 10*3/mm3      Basophils, Absolute 0.05 10*3/mm3      Immature Grans, Absolute 0.06 10*3/mm3      nRBC 0.0 /100 WBC              Imaging:    XR Chest 1 View    Result Date: 2/22/2024  PROCEDURE: XR CHEST 1 VW  COMPARISON: [facility], , CHEST AP/PA 1 VIEW, 12/20/2019, 21:22.  INDICATIONS: CHEST PAINS, TODAY  FINDINGS:  Cardiac and mediastinal contours are normal.  The lungs are clear.  Pulmonary vascularity is normal.  There is no pneumothorax.       No acute cardiopulmonary findings.       ALEXIS CONSTANTINO MD       Electronically Signed and Approved By: ALEXIS CONSTANTINO MD on 2/22/2024 at 3:08                Differential Diagnosis and Discussion:    Palpitations: Differential diagnosis includes but is not limited to anxiety, atrioventricular blocks, mitral valve disease, hypoxia, coronary artery disease, hypokalemia, anemia, fever, COPD, congestive heart failure, pericarditis, Kathy-Parkinson-White syndrome, pulmonary embolism, SVT, atrial fibrillation, atrial flutter, sinus tachycardia, thyrotoxicosis, and pheochromocytoma.    All labs were reviewed and interpreted by me.  All X-rays impressions were independently interpreted by me.  EKG was interpreted by me.    MDM  Number of  Diagnoses or Management Options  Diagnosis management comments: Patient is afebrile nontoxic-appearing.  Vital signs are stable.  At time of evaluation he is lying in bed in no acute distress.  EKG shows sinus rhythm no acute ST elevation.  Troponin was negative.  Labs show no significant abnormality.  Recommend follow-up with his primary physician.  Discussed return precautions, discharge instructions and answered all his questions.           Amount and/or Complexity of Data Reviewed  Clinical lab tests: reviewed  Tests in the radiology section of CPT®: reviewed  Review and summarize past medical records: yes  Independent visualization of images, tracings, or specimens: yes    Risk of Complications, Morbidity, and/or Mortality  Presenting problems: moderate  Management options: moderate                 Patient Care Considerations:    CT CHEST: I considered ordering a CT scan of the chest, however no shortness of breath      Consultants/Shared Management Plan:    None    Social Determinants of Health:    Patient is independent, reliable, and has access to care.       Disposition and Care Coordination:    Discharged: The patient is suitable and stable for discharge with no need for consideration of admission.    I have explained the patient´s condition, diagnoses and treatment plan based on the information available to me at this time. I have answered questions and addressed any concerns. The patient has a good  understanding of the patient´s diagnosis, condition, and treatment plan as can be expected at this point. The vital signs have been stable. The patient´s condition is stable and appropriate for discharge from the emergency department.      The patient will pursue further outpatient evaluation with the primary care physician or other designated or consulting physician as outlined in the discharge instructions. They are agreeable to this plan of care and follow-up instructions have been explained in detail. The  patient has received these instructions in written format and has expressed an understanding of the discharge instructions. The patient is aware that any significant change in condition or worsening of symptoms should prompt an immediate return to this or the closest emergency department or call to 911.  I have explained discharge medications and the need for follow up with the patient/caretakers. This was also printed in the discharge instructions. Patient was discharged with the following medications and follow up:      Medication List      No changes were made to your prescriptions during this visit.      Jaimee Mulligan, APRN  [address] [place] [address]  [phone]    In 2 days      Clarence Espinoza MD  [address] [place] [address]  [phone]    Schedule an appointment as soon as possible for a visit          Final diagnoses:   Palpitations        ED Disposition       ED Disposition   Discharge    Condition   Stable    Comment   --               This medical record created using voice recognition software.             Rachel Funk MD  02/22/24 [id number]

## 2024-02-25 LAB
QT INTERVAL: 329 MS
QTC INTERVAL: 410 MS

## 2024-03-22 ENCOUNTER — HOSPITAL ENCOUNTER (OUTPATIENT)
Dept: SLEEP MEDICINE | Facility: HOSPITAL | Age: 29
Discharge: HOME | End: 2024-03-22
Admitting: INTERNAL MEDICINE
Payer: COMMERCIAL

## 2024-03-22 DIAGNOSIS — R61 NIGHT SWEATS: ICD-10-CM

## 2024-03-22 DIAGNOSIS — G47.10 HYPERSOMNIA: ICD-10-CM

## 2024-03-22 DIAGNOSIS — E66.9 OBESITY (BMI 30-39.9): ICD-10-CM

## 2024-03-22 DIAGNOSIS — R06.83 SNORING: ICD-10-CM

## 2024-03-22 DIAGNOSIS — G47.26 CIRCADIAN RHYTHM SLEEP DISORDER, SHIFT WORK TYPE: ICD-10-CM

## 2024-03-22 DIAGNOSIS — R29.818 SUSPECTED SLEEP APNEA: ICD-10-CM

## 2024-03-22 PROCEDURE — 95806 SLEEP STUDY UNATT&RESP EFFT: CPT

## 2024-04-07 DIAGNOSIS — G47.34 SLEEP RELATED HYPOXIA: ICD-10-CM

## 2024-04-07 DIAGNOSIS — G47.33 OSA (OBSTRUCTIVE SLEEP APNEA): Primary | ICD-10-CM
